# Patient Record
Sex: FEMALE | Race: WHITE | NOT HISPANIC OR LATINO | Employment: UNEMPLOYED | URBAN - METROPOLITAN AREA
[De-identification: names, ages, dates, MRNs, and addresses within clinical notes are randomized per-mention and may not be internally consistent; named-entity substitution may affect disease eponyms.]

---

## 2024-01-12 ENCOUNTER — ANCILLARY PROCEDURE (OUTPATIENT)
Dept: RADIOLOGY | Facility: CLINIC | Age: 49
End: 2024-01-12
Payer: COMMERCIAL

## 2024-01-12 DIAGNOSIS — E78.2 MIXED HYPERLIPIDEMIA: ICD-10-CM

## 2024-01-12 PROCEDURE — 75571 CT HRT W/O DYE W/CA TEST: CPT

## 2024-09-24 ENCOUNTER — OFFICE VISIT (OUTPATIENT)
Dept: URGENT CARE | Age: 49
End: 2024-09-24
Payer: COMMERCIAL

## 2024-09-24 VITALS
SYSTOLIC BLOOD PRESSURE: 156 MMHG | BODY MASS INDEX: 31.39 KG/M2 | OXYGEN SATURATION: 97 % | TEMPERATURE: 98.1 F | WEIGHT: 185 LBS | DIASTOLIC BLOOD PRESSURE: 94 MMHG | HEART RATE: 66 BPM | RESPIRATION RATE: 16 BRPM

## 2024-09-24 DIAGNOSIS — M79.5 FOREIGN BODY (FB) IN SOFT TISSUE: Primary | ICD-10-CM

## 2024-09-24 RX ORDER — FAMOTIDINE 20 MG/1
20 TABLET, FILM COATED ORAL DAILY
COMMUNITY

## 2024-09-24 RX ORDER — PRAVASTATIN SODIUM 20 MG/1
20 TABLET ORAL DAILY
COMMUNITY

## 2024-09-24 RX ORDER — VENLAFAXINE 100 MG/1
135 TABLET ORAL DAILY
COMMUNITY

## 2024-09-24 ASSESSMENT — ENCOUNTER SYMPTOMS: CONSTITUTIONAL NEGATIVE: 1

## 2024-09-24 ASSESSMENT — PAIN SCALES - GENERAL: PAINLEVEL: 2

## 2024-09-24 NOTE — PROGRESS NOTES
[facility]  History and Physical    Patient's Name/Date of Birth: Ana Paula Kunz / 1959 (65 y.o.)    PCP:  Dr. Wade    Chief Complaint:  colonoscopy eval    History of Present Illness:  65 yr old female referred by Dr. Wade for colonoscopy.  Pt states she had a normal colonoscopy a long time ago.  Pt denies abd pain, change in bowel habits, blood in stool, unintentional weight loss, or family hx of colon cancer.    Past Medical History:   Diagnosis Date    Acute gouty arthritis 06/09/2020    Bell's palsy     Bipolar disorder ([facility])     bipolar    Carpal tunnel syndrome 08/04/2016    Hyperlipidemia     Non-insulin dependent type 2 diabetes mellitus ([facility])     Sepsis ([facility]) 12/06/2019    Systemic primary arterial hypertension 08/04/2016    Type 2 diabetes with peripheral circulatory disorder, controlled ([facility]) 05/25/2023       Past Surgical History:   Procedure Laterality Date    COLONOSCOPY      ECHO COMPLETE  06/22/2013         VAGINA SURGERY         Family History   Problem Relation Age of Onset    Other Mother         diverticulitis    Heart Disease Father 68    High Blood Pressure Father     No Known Problems Brother        Social History     Socioeconomic History    Marital status: Single     Spouse name: Not on file    Number of children: Not on file    Years of education: Not on file    Highest education level: Not on file   Occupational History    Not on file   Tobacco Use    Smoking status: Never    Smokeless tobacco: Never   Vaping Use    Vaping status: Never Used   Substance and Sexual Activity    Alcohol use: No    Drug use: No    Sexual activity: Not Currently   Other Topics Concern    Not on file   Social History Narrative    Not on file     Social Determinants of Health     Financial Resource Strain: Low Risk  (3/22/2024)    Overall Financial Resource Strain (CARDIA)     Difficulty of Paying Living Expenses: Not hard at all   Food Insecurity: No Food Insecurity (3/22/2024)     patient has a splinter in her left upper posterior thigh Subjective   Patient ID: Laurita Valdivia is a 49 y.o. female. They present today with a chief complaint of Foreign Body in Skin.    History of Present Illness  Patient is here for a splinter in her left upper posterior thigh for the last week, she got the splinter in her thigh sliding into a wooden bench seat at a restaurant while she was on vacation.  She waited until she got home to present for removal.  Denies fever or chills.  Denies any signs of infection.        Past Medical History  Allergies as of 09/24/2024    (No Known Allergies)       (Not in a hospital admission)       Past Medical History:   Diagnosis Date    Personal history of other medical treatment 06/11/2021    History of screening mammography       History reviewed. No pertinent surgical history.         Review of Systems  Review of Systems   Constitutional: Negative.    Skin:         Foreign body in the skin of her left posterior thigh near the buttocks                                  Objective    Vitals:    09/24/24 1112   BP: (!) 156/94   Pulse: 66   Resp: 16   Temp: 36.7 °C (98.1 °F)   SpO2: 97%   Weight: 83.9 kg (185 lb)     No LMP recorded.    Physical Exam  Vitals and nursing note reviewed.   Constitutional:       Appearance: Normal appearance. She is obese.      Comments: Pleasant, very talkative active female in no acute distress.   HENT:      Head: Normocephalic and atraumatic.   Musculoskeletal:         General: Normal range of motion.   Skin:     Comments: Examination of the area of interest which is the left upper posterior thigh skin shows no open wound.  There is no evidence of infection.  There is an approximately 1.5 cm linear foreign body palpable just under the skin.   Neurological:      Mental Status: She is alert.       Embedded Foreign Body Removal    Date/Time: 9/24/2024 1:19 PM    Performed by: ASHLEIGH France  Authorized by: ASHLEIGH [place]    Consent:      Consent obtained:  Verbal    Consent given by:  Patient    Risks, benefits, and alternatives were discussed: yes      Risks discussed:  Bleeding, infection, pain, worsening of condition, nerve damage and poor cosmetic result    Alternatives discussed:  Alternative treatment and referral  Universal protocol:     Procedure explained and questions answered to patient or proxy's satisfaction: yes      Relevant documents present and verified: no      Test results available: no      Imaging studies available: no      Required blood products, implants, devices, and special equipment available: no      Site/side marked: no      Immediately prior to procedure, a time out was called: no      Patient identity confirmed:  Verbally with patient  Location:     Location:  Leg    Leg location:  L hip    Depth:  Subcutaneous    Tendon involvement:  None  Pre-procedure details:     Imaging:  None    Neurovascular status: intact      Preparation: Patient was prepped and draped in usual sterile fashion    Anesthesia:     Anesthesia method:  Local infiltration    Local anesthetic:  Lidocaine 1% w/o epi  Procedure type:     Procedure complexity:  Simple  Procedure details:     Incision length:  3mm    Localization method:  Probed    Dissection of underlying tissues: no      Bloodless field: yes      Removal mechanism:  Forceps    Foreign bodies recovered:  1    Description:  2 cm wooden splinter    Intact foreign body removal: yes    Post-procedure details:     Neurovascular status: intact      Confirmation:  No additional foreign bodies on visualization    Skin closure:  None    Dressing:  Non-adherent dressing and adhesive bandage    Procedure completion:  Tolerated      Point of Care Test & Imaging Results from this visit  No results found for this visit on 09/24/24.   No results found.    Diagnostic study results (if any) were reviewed by ASHLEIGH [place].    Assessment/Plan   Allergies, medications, history, and pertinent  labs/EKGs/Imaging reviewed by ASHLEIGH [place].     Medical Decision Making  Despite the fact that the patient's wound was almost a week old, the foreign body was readily identified through the skin as it was fairly superficial.  Opted to remove the foreign body here.  Her tetanus was updated.  She was advised to follow-up with her family doctor for any signs of infection or return to urgent care for any signs of infection.    Orders and Diagnoses  There are no diagnoses linked to this encounter.    Medical Admin Record      Patient disposition: Home    Electronically signed by ASHLEIGH France  12:38 PM